# Patient Record
Sex: MALE | Race: WHITE | Employment: OTHER | URBAN - METROPOLITAN AREA
[De-identification: names, ages, dates, MRNs, and addresses within clinical notes are randomized per-mention and may not be internally consistent; named-entity substitution may affect disease eponyms.]

---

## 2021-03-05 ENCOUNTER — OFFICE VISIT (OUTPATIENT)
Dept: CARDIOLOGY CLINIC | Age: 71
End: 2021-03-05
Payer: MEDICARE

## 2021-03-05 ENCOUNTER — CLINICAL SUPPORT (OUTPATIENT)
Dept: CARDIOLOGY CLINIC | Age: 71
End: 2021-03-05
Payer: MEDICARE

## 2021-03-05 VITALS
OXYGEN SATURATION: 98 % | HEART RATE: 80 BPM | HEIGHT: 72 IN | DIASTOLIC BLOOD PRESSURE: 86 MMHG | SYSTOLIC BLOOD PRESSURE: 134 MMHG | WEIGHT: 190 LBS | BODY MASS INDEX: 25.73 KG/M2

## 2021-03-05 DIAGNOSIS — Z76.89 ESTABLISHING CARE WITH NEW DOCTOR, ENCOUNTER FOR: ICD-10-CM

## 2021-03-05 DIAGNOSIS — I47.1 SVT (SUPRAVENTRICULAR TACHYCARDIA) (HCC): Primary | ICD-10-CM

## 2021-03-05 DIAGNOSIS — E11.9 TYPE 2 DIABETES MELLITUS WITHOUT COMPLICATION, WITHOUT LONG-TERM CURRENT USE OF INSULIN (HCC): ICD-10-CM

## 2021-03-05 DIAGNOSIS — E78.5 DYSLIPIDEMIA: ICD-10-CM

## 2021-03-05 PROCEDURE — 93225 XTRNL ECG REC<48 HRS REC: CPT | Performed by: INTERNAL MEDICINE

## 2021-03-05 PROCEDURE — 93010 ELECTROCARDIOGRAM REPORT: CPT | Performed by: INTERNAL MEDICINE

## 2021-03-05 PROCEDURE — G0463 HOSPITAL OUTPT CLINIC VISIT: HCPCS | Performed by: INTERNAL MEDICINE

## 2021-03-05 PROCEDURE — 93005 ELECTROCARDIOGRAM TRACING: CPT | Performed by: INTERNAL MEDICINE

## 2021-03-05 PROCEDURE — 93227 XTRNL ECG REC<48 HR R&I: CPT | Performed by: INTERNAL MEDICINE

## 2021-03-05 PROCEDURE — 99204 OFFICE O/P NEW MOD 45 MIN: CPT | Performed by: INTERNAL MEDICINE

## 2021-03-05 RX ORDER — METFORMIN HYDROCHLORIDE 500 MG/1
TABLET ORAL
COMMUNITY
Start: 2021-01-04

## 2021-03-05 RX ORDER — PRAVASTATIN SODIUM 40 MG/1
40 TABLET ORAL
COMMUNITY
Start: 2021-01-04

## 2021-03-05 NOTE — PATIENT INSTRUCTIONS
Exercise stress Nuclear- SVT, Abnormal EKG Echo- SVT 
 
24 Hr holter- SVT See Dr. Dwaine Segal in 1 month.

## 2021-03-05 NOTE — PROGRESS NOTES
Reason for Consult: Palpitations, SVT    Referring: Clara Polanco MD    HPI: Mauro Cabrera is a 78 y.o. male regular history significant for diabetes, hypertension is here for evaluation of symptoms of palpitations. Symptoms occurred in October 2020. He had excessive amount of caffeine by drinking caffeinated coffee as well as tea that morning and soon after. He felt significant racing of the heart. He went to the [facility] ER and was diagnosed with SVT. That he was given what appears to be an adenosine has and is heart rate instantaneously improved to normal rhythm. I do not have any records available at this time to review. He has not had any previous episodes prior to this index episode and he has not had any new episodes since that episode. He had no symptoms of chest pain, lightheadedness, dizziness, presyncope or syncope. He has not had significant caffeine intake since then. He does not drink significant amount of alcohol or any other energy drinks. He smokes tobacco.  He is now retired. No significant previous cardiac evaluation. EKG in my office today demonstrated normal sinus rhythm with left axis deviation with left anterior fascicular block with nonspecific ST changes with poor R wave progression. Plan:    1. SVT: Further evaluation with 24-hour Holter monitor as well as echocardiogram for cardiac size and chamber size. No recurrent episodes therefore at this time we will continue to monitor. 2.  Abnormal EKG: Left anterior fascicular block with left axis deviation with nonspecific ST changes with poor R wave progression. We will proceed with a exercise stress nuclear study. 3.  Dyslipidemia: Continue Pravachol. Lipids are being followed by Dr. Ellen Hooker. 4.  Diabetes: Continue Metformin. 5.  Follow-up with me in 1 month. ATTENTION:   This medical record was transcribed using an electronic medical records/speech recognition system.   Although proofread, it may and can contain electronic, spelling and other errors. Corrections may be executed at a later time. Please feel free to contact us for any clarifications as needed. History reviewed. No pertinent past medical history. History reviewed. No pertinent surgical history.           Family History   Problem Relation Age of Onset    Prostate Cancer Father            Social History     Socioeconomic History    Marital status:      Spouse name: Not on file    Number of children: Not on file    Years of education: Not on file    Highest education level: Not on file   Occupational History    Not on file   Social Needs    Financial resource strain: Not on file    Food insecurity     Worry: Not on file     Inability: Not on file    Transportation needs     Medical: Not on file     Non-medical: Not on file   Tobacco Use    Smoking status: Never Smoker    Smokeless tobacco: Never Used   Substance and Sexual Activity    Alcohol use: Yes     Comment: rare    Drug use: Never    Sexual activity: Not on file   Lifestyle    Physical activity     Days per week: Not on file     Minutes per session: Not on file    Stress: Not on file   Relationships    Social connections     Talks on phone: Not on file     Gets together: Not on file     Attends Nondenominational service: Not on file     Active member of club or organization: Not on file     Attends meetings of clubs or organizations: Not on file     Relationship status: Not on file    Intimate partner violence     Fear of current or ex partner: Not on file     Emotionally abused: Not on file     Physically abused: Not on file     Forced sexual activity: Not on file   Other Topics Concern    Not on file   Social History Narrative    Not on file         Allergies   Allergen Reactions    Aspirin Anaphylaxis    Penicillins Hives            Current Outpatient Medications   Medication Sig Dispense Refill    pravastatin (PRAVACHOL) 40 mg tablet Take 40 mg by mouth nightly.  metFORMIN (GLUCOPHAGE) 500 mg tablet TAKE 2 TABLETS BY MOUTH TWICE DAILY      prednisone (STERAPRED DS) 10 mg dose pack Take 2 tab 3 times a day for 3 days, then take 1 tab 3 times a day for 3 days, then take 1 tab 2 times a day for 3 days, then take 1 tab once a day for 3 days  Disp: 36 36 tablet 0    diazepam (VALIUM) 5 mg tablet Take 1 tablet by mouth every eight (8) hours. 15 tablet 0    hydrocodone-acetaminophen (NORCO) 5-325 mg per tablet Take 1 tablet by mouth every four (4) hours as needed for Pain. 12 tablet 0        ROS:  12 point review of systems was performed.  All negative except for HPI     Physical Exam:  Visit Vitals  /86 (BP 1 Location: Left upper arm, BP Patient Position: Sitting, BP Cuff Size: Adult)   Pulse 80   Ht 6' (1.829 m)   Wt 190 lb (86.2 kg)   SpO2 98%   BMI 25.77 kg/m²       Gen:  Well-developed, well-nourished, in no acute distress  HEENT:  Pink conjunctivae, PERRL, hearing intact to voice, moist mucous membranes  Neck:  Supple, without masses, thyroid non-tender  Resp:  No accessory muscle use, clear breath sounds without wheezes rales or rhonchi  Card:  No murmurs, normal S1, S2 without thrills, bruits or peripheral edema  Abd:  Soft, non-tender, non-distended, normoactive bowel sounds are present, no palpable organomegaly and no detectable hernias  Lymph:  No cervical or inguinal adenopathy  Musc:  No cyanosis or clubbing  Skin:  No rashes or ulcers, skin turgor is good  Neuro:  Cranial nerves are grossly intact, no focal motor weakness, follows commands appropriately  Psych:  Good insight, oriented to person, place and time, alert     Labs:     No results found for: WBC, WBCT, WBCPOC, HGB, HGBPOC, HCT, HCTPOC, PLT, PLTPOC, MCV, MCVPOC, HGBEXT, HCTEXT, PLTEXT  No results found for: HBA1C, GPR5FZLI, HGBE8, GLU, GESTF, GLUCPOC, MCACR, MCA1, MCA2, MCA3, MCAU, LDL, LDLC, DLDLP, HPIL, CREAPOC, ACREA, CREA, REFC3, REFC4, [id number]   No results found for: CHOL, CHOLPOCT, HDL, LDL, LDLC, LDLCPOC, LDLCEXT, TRIGL, TGLPOCT, CHHD, CHHDX  No results found for: ALTPOC, ALT, ASTPOC, GGT, AP, APIT, APX, CBIL, TBIL, TBILI, ALB, ALBPOC, TP, NH3, NH4, INR, INREXT, PTP, PTINR, PTEXT, PLT, PLTPOC, HCABQL, HBSAG, AFP, INREXT, PTEXT, PLTEXT  No results found for: INR, INREXT, PTMR, PTP, PT1, PT2, INREXT   No results found for: GFRNA, GFRNAPOC, GFRAA, GFRAAPOC, CREA, CREAPOC, BUN, IBUN, BUNPOC, NA, NAPOC, K, KPOCT, CL, CLPOC, CO2, CO2POC, MG, PHOS, ALBEU, PTH, PTHILT, EPO  No results found for: PSA, Holley Tommy, PSAR3, [id number], [id number]  No results found for: TSH, TSH2, TSH3, TSHP, TSHELE, TSHEXT, TT3, T3U, T3UP, FRT3, FT3, FT4, FT4P, T4, T4P, FT4T, TT7, TSHEXT   No results found for: GLU, GLUCPOC   No results found for: CPK, RCK1, RCK2, RCK3, RCK4, CKMB, CKNDX, CKND1, TROPT, TROIQ, BNPP, BNP   No results found for: BNP, BNPP, BNPPPOC, XBNPT, BNPNT   No results found for: NA, K, CL, CO2, AGAP, GLU, BUN, CREA, BUCR, GFRAA, GFRNA, CA, GFRAA   No results found for: NA, K, CL, CO2, AGAP, GLU, BUN, CREA, BUCR, GFRAA, GFRNA, CA, TBIL, TBILI, ALT, AP, TP, ALB, GLOB, AGRAT   No results found for: HBA1C, LEM9PQZP, HGBE8, VTK9WTQM, CIW6NINO      No results for input(s): CPK, CKMB, TROIQ in the last 72 hours. No lab exists for component: CKQMB, CPKMB            Timoteo Jimenez MD, [facility]

## 2021-03-05 NOTE — PROGRESS NOTES
Room 4    Visit Vitals  /86 (BP 1 Location: Left upper arm, BP Patient Position: Sitting, BP Cuff Size: Adult)   Pulse 80   Ht 6' (1.829 m)   Wt 190 lb (86.2 kg)   SpO2 98%   BMI 25.77 kg/m²       Fluttering episode after drinking a lot of caffeine and went to [facility] ER, Oct. 2020     Prostate biopsy   Scheduled next Tuesday  [facility]  Dr. Bigg Shelby     Seen cardiology before: YES, years ago, as a work up, it ended up being to [facility] Fever     Chest pain: no  Shortness of breath: no  Edema: no  Palpitations: None since October 2020  Dizziness: no    ER/Urgent care/Hospitalizations for your symptoms: ABOVE

## 2021-03-05 NOTE — PROGRESS NOTES
Applied 24 hr holter per Dr Pascual Ocampo dx: SVT. Pt has #[id number]   Chargeable visit.   [employer]

## 2021-03-05 NOTE — PROGRESS NOTES
All orders entered per VO of Dr. Troy Worthington:        Exercise stress Nuclear- SVT, Abnormal EKG     Echo- SVT     24 Hr holter- SVT     See Dr. Troy Worthington in 1 month.

## 2021-03-11 ENCOUNTER — DOCUMENTATION ONLY (OUTPATIENT)
Dept: CARDIOLOGY CLINIC | Age: 71
End: 2021-03-11

## 2021-03-11 NOTE — PROGRESS NOTES
[employer] holter results:    Findings  Jeana López was in Sinus. The average heart rate, excluding ectopy, was 79 BPM with a minimum of 60 BPM at 05:40 D2 and a maximum of 113 BPM at 11:42 D2. Heart beats, including ectopy, totaled 900154 beats. VENTRICULAR ECTOPICS totaled 1391 averaging 55.1 per hour with 1226 single, 48 paired, 109 trigeminy and 0 R on T. BIGEMINY runs occurred  2 times and totaled 8 beats. SUPRAVENTRICULAR ECTOPICS totaled 663 averaging 26.3 per hour ,with 56 single and 2 paired beats. There was 1 SUPRAVENTRICULAR TACHYCARDIA with 605 beats at 13:06 D2 at a rate of 202 BPM.  Diary was submitted, symptoms/activities noted.  No triggered events noted    results sent to scanning

## 2021-03-16 NOTE — PROGRESS NOTES
Pls notify>    Had an episode of long SVT on holter. I recommend starting Toprol XL 25mg po daily. Await Echo and Nuc stress results. He has appt with me on Aprl 21. [employer] holter results:     Findings  Emeterio Spencekiman was in Sinus. The average heart rate, excluding ectopy, was 79 BPM with a minimum of 60 BPM at 05:40 D2 and a maximum of 113 BPM at 11:42 D2. Heart beats, including ectopy, totaled 001731 beats. VENTRICULAR ECTOPICS totaled 1391 averaging 55.1 per hour with 1226 single, 48 paired, 109 trigeminy and 0 R on T. BIGEMINY runs occurred  2 times and totaled 8 beats. SUPRAVENTRICULAR ECTOPICS totaled 663 averaging 26.3 per hour ,with 56 single and 2 paired beats. There was 1 SUPRAVENTRICULAR TACHYCARDIA with 605 beats at 13:06 D2 at a rate of 202 BPM.  Diary was submitted, symptoms/activities noted.  No triggered events noted     results sent to scanning

## 2021-03-17 ENCOUNTER — TELEPHONE (OUTPATIENT)
Dept: CARDIOLOGY CLINIC | Age: 71
End: 2021-03-17

## 2021-03-17 RX ORDER — METOPROLOL SUCCINATE 25 MG/1
25 TABLET, EXTENDED RELEASE ORAL DAILY
Qty: 90 TAB | Refills: 3 | Status: SHIPPED | OUTPATIENT
Start: 2021-03-17

## 2021-03-17 NOTE — TELEPHONE ENCOUNTER
Per Dr. Nikki Dunham, [facility] an episode of long SVT on holter. I recommend starting Toprol XL 25mg po daily. Await Echo and Nuc stress results. He has appt with me on Aprl 21. \"    Spoke with pt, I identified with name and birth date. I informed the pt of Dr. Rolando Arora review of results and recommendations. Pt expressed understanding. Pt has no further questions or concerns at this time. Per VO of Dr. Patel Temple: 3/5/2021    Future Appointments   Date Time Provider Jeff Chaney   3/18/2021 12:30 PM FAHAD ELLIOTT BS AMB   3/18/2021  4:00 PM FAHAD RUST BS AMB   4/12/2021  2:20 PM Cynthia Soliman MD [facility] BS AMB       Requested Prescriptions     Pending Prescriptions Disp Refills    metoprolol succinate (TOPROL-XL) 25 mg XL tablet 90 Tab 3     Sig: Take 1 Tab by mouth daily.

## 2021-03-18 ENCOUNTER — ANCILLARY PROCEDURE (OUTPATIENT)
Dept: CARDIOLOGY CLINIC | Age: 71
End: 2021-03-18
Payer: MEDICARE

## 2021-03-18 VITALS
BODY MASS INDEX: 25.73 KG/M2 | HEIGHT: 72 IN | DIASTOLIC BLOOD PRESSURE: 88 MMHG | SYSTOLIC BLOOD PRESSURE: 138 MMHG | WEIGHT: 190 LBS

## 2021-03-18 VITALS
BODY MASS INDEX: 25.73 KG/M2 | DIASTOLIC BLOOD PRESSURE: 84 MMHG | SYSTOLIC BLOOD PRESSURE: 132 MMHG | HEIGHT: 72 IN | WEIGHT: 190 LBS

## 2021-03-18 DIAGNOSIS — I47.1 SVT (SUPRAVENTRICULAR TACHYCARDIA) (HCC): ICD-10-CM

## 2021-03-18 PROCEDURE — 78452 HT MUSCLE IMAGE SPECT MULT: CPT | Performed by: INTERNAL MEDICINE

## 2021-03-18 PROCEDURE — 93016 CV STRESS TEST SUPVJ ONLY: CPT | Performed by: INTERNAL MEDICINE

## 2021-03-18 PROCEDURE — A9500 TC99M SESTAMIBI: HCPCS | Performed by: INTERNAL MEDICINE

## 2021-03-18 PROCEDURE — 93306 TTE W/DOPPLER COMPLETE: CPT | Performed by: INTERNAL MEDICINE

## 2021-03-18 PROCEDURE — 93018 CV STRESS TEST I&R ONLY: CPT | Performed by: INTERNAL MEDICINE

## 2021-03-18 RX ORDER — TETRAKIS(2-METHOXYISOBUTYLISOCYANIDE)COPPER(I) TETRAFLUOROBORATE 1 MG/ML
10 INJECTION, POWDER, LYOPHILIZED, FOR SOLUTION INTRAVENOUS ONCE
Status: COMPLETED | OUTPATIENT
Start: 2021-03-18 | End: 2021-03-18

## 2021-03-18 RX ORDER — TETRAKIS(2-METHOXYISOBUTYLISOCYANIDE)COPPER(I) TETRAFLUOROBORATE 1 MG/ML
30 INJECTION, POWDER, LYOPHILIZED, FOR SOLUTION INTRAVENOUS ONCE
Status: COMPLETED | OUTPATIENT
Start: 2021-03-18 | End: 2021-03-18

## 2021-03-18 RX ADMIN — TETRAKIS(2-METHOXYISOBUTYLISOCYANIDE)COPPER(I) TETRAFLUOROBORATE 8.6 MILLICURIE: 1 INJECTION, POWDER, LYOPHILIZED, FOR SOLUTION INTRAVENOUS at 12:35

## 2021-03-18 RX ADMIN — TECHNETIUM TC 99M SESTAMIBI 26.2 MILLICURIE: 1 INJECTION INTRAVENOUS at 13:50

## 2021-03-19 LAB
ECHO AO ASC DIAM: 3.53 CM
ECHO AO ROOT DIAM: 3.99 CM
ECHO LA AREA 4C: 12.36 CM2
ECHO LA MAJOR AXIS: 3.2 CM
ECHO LA MINOR AXIS: 1.54 CM
ECHO LA VOL 2C: 36.7 ML (ref 18–58)
ECHO LA VOL 4C: 26.35 ML (ref 18–58)
ECHO LA VOL BP: 35.89 ML (ref 18–58)
ECHO LA VOL/BSA BIPLANE: 17.22 ML/M2 (ref 16–28)
ECHO LA VOLUME INDEX A2C: 17.61 ML/M2 (ref 16–28)
ECHO LA VOLUME INDEX A4C: 12.64 ML/M2 (ref 16–28)
ECHO LV E' LATERAL VELOCITY: 6.83 CM/S
ECHO LV E' SEPTAL VELOCITY: 7.28 CM/S
ECHO LV EDV A2C: 69.34 ML
ECHO LV EDV A4C: 78.32 ML
ECHO LV EDV BP: 74.15 ML (ref 67–155)
ECHO LV EDV INDEX A4C: 37.6 ML/M2
ECHO LV EDV INDEX BP: 35.6 ML/M2
ECHO LV EDV NDEX A2C: 33.3 ML/M2
ECHO LV EJECTION FRACTION A2C: 55 PERCENT
ECHO LV EJECTION FRACTION A4C: 56 PERCENT
ECHO LV EJECTION FRACTION BIPLANE: 55.4 PERCENT (ref 55–100)
ECHO LV ESV A2C: 31.34 ML
ECHO LV ESV A4C: 34.85 ML
ECHO LV ESV BP: 33.07 ML (ref 22–58)
ECHO LV ESV INDEX A2C: 15 ML/M2
ECHO LV ESV INDEX A4C: 16.7 ML/M2
ECHO LV ESV INDEX BP: 15.9 ML/M2
ECHO LV INTERNAL DIMENSION DIASTOLIC: 4.62 CM (ref 4.2–5.9)
ECHO LV INTERNAL DIMENSION SYSTOLIC: 3.05 CM
ECHO LV IVSD: 1.16 CM (ref 0.6–1)
ECHO LV MASS 2D: 200.3 G (ref 88–224)
ECHO LV MASS INDEX 2D: 96.1 G/M2 (ref 49–115)
ECHO LV POSTERIOR WALL DIASTOLIC: 1.19 CM (ref 0.6–1)
ECHO MV A VELOCITY: 59.13 CM/S
ECHO MV AREA PHT: 2.24 CM2
ECHO MV E DECELERATION TIME (DT): 338.89 MS
ECHO MV E VELOCITY: 45.97 CM/S
ECHO MV E/A RATIO: 0.78
ECHO MV E/E' LATERAL: 6.73
ECHO MV E/E' RATIO (AVERAGED): 6.52
ECHO MV E/E' SEPTAL: 6.31
ECHO MV PRESSURE HALF TIME (PHT): 98.28 MS
ECHO RA AREA 4C: 13.93 CM2
ECHO RV INTERNAL DIMENSION: 3.15 CM
ECHO RV TAPSE: 1.93 CM (ref 1.5–2)
LA VOL DISK BP: 32.99 ML (ref 18–58)
STRESS ANGINA INDEX: 0
STRESS BASELINE DIAS BP: 88 MMHG
STRESS BASELINE HR: 88 BPM
STRESS BASELINE SYS BP: 138 MMHG
STRESS ESTIMATED WORKLOAD: 10 METS
STRESS EXERCISE DUR MIN: NORMAL MIN:SEC
STRESS O2 SAT PEAK: 98 %
STRESS O2 SAT REST: 99 %
STRESS PEAK DIAS BP: 74 MMHG
STRESS PEAK SYS BP: 164 MMHG
STRESS PERCENT HR ACHIEVED: 88 %
STRESS POST PEAK HR: 134 BPM
STRESS RATE PRESSURE PRODUCT: NORMAL BPM*MMHG
STRESS SR DUKE TREADMILL SCORE: 7
STRESS ST DEPRESSION: 0 MM
STRESS ST ELEVATION: 0 MM
STRESS TARGET HR: 152 BPM

## 2021-03-26 ENCOUNTER — TELEPHONE (OUTPATIENT)
Dept: CARDIOLOGY CLINIC | Age: 71
End: 2021-03-26

## 2021-03-26 NOTE — TELEPHONE ENCOUNTER
Patient is calling in wanting a call back of test results from nuclear and echo so that he can be cleared for [facility] to do the biopsy on his prostate. He states he needs to know something soon because they wont schedule him. Patient can be reached at [phone].

## 2021-03-29 NOTE — TELEPHONE ENCOUNTER
Returned pt call, ID [id number]. Pt had  called inquiring test results. I informed the pt of Dr Beka Lyle  review of his Echo and Nuclear Stress  Test. I also informed the pt he should be receiving his results in the mail within a day or two. Pt expressed understanding. Pt has no further questions or concerns at this time.

## 2021-07-13 ENCOUNTER — TELEPHONE (OUTPATIENT)
Dept: CARDIOLOGY CLINIC | Age: 71
End: 2021-07-13

## 2021-07-13 NOTE — LETTER
7/13/2021  [address] [place] [id number]      Dear Kedar Jeff Petty,     We had an appointment reserved for you today and were concerned when you did not show or call within 24 hours to cancel the appointment. Our policy is to call patients two days prior to their appointment to remind them of the date and time. We perform these calls as a courtesy to our patients and to allow us the opportunity to rebook the time slot should the appointment not be necessary. Recognizing that everyones time is valuable and that appointment time is limited, we ask that you provide 24 hours notice if you are unable to keep your appointment. Please call us at your earliest convenience to reschedule your appointment as your provider felt it was important to see you. Thank you for your anticipated cooperation.      The scheduling staff:    [facility] [address] [id number] [address] [address] [phone]

## 2021-07-13 NOTE — LETTER
7/13/2021 8:48 AM    Mr. Connor Marie  [id number] [facility] [id number]      To Whom it may Concern. Connor Marie underwent cardiac evaluation and based on the available data he is a intermediate risk candidate from cardiac standpoint, to undergo an intermediate risk surgery. Connor Marie may proceed with planned prostate surgery. Thanks for giving us the opportunity to participate in the care of your patient.        Sincerely,        Juliano Shabazz MD

## 2021-07-13 NOTE — TELEPHONE ENCOUNTER
[facility] calling to receive cardiac clearance on the patient to have surgery on 7/19/21, paperwork was faxed for the clearance to filled out on 7/08/21, please advise        [facility]  [phone]  Fax [phone]

## 2023-05-10 RX ORDER — METOPROLOL SUCCINATE 25 MG/1
TABLET, EXTENDED RELEASE ORAL DAILY
COMMUNITY
Start: 2021-03-17

## 2023-05-10 RX ORDER — PRAVASTATIN SODIUM 40 MG
40 TABLET ORAL NIGHTLY
COMMUNITY
Start: 2021-01-04